# Patient Record
Sex: MALE | Race: OTHER | HISPANIC OR LATINO | URBAN - METROPOLITAN AREA
[De-identification: names, ages, dates, MRNs, and addresses within clinical notes are randomized per-mention and may not be internally consistent; named-entity substitution may affect disease eponyms.]

---

## 2022-05-04 ENCOUNTER — OFFICE VISIT (OUTPATIENT)
Dept: URBAN - METROPOLITAN AREA CLINIC 63 | Facility: CLINIC | Age: 32
End: 2022-05-04

## 2022-07-08 ENCOUNTER — CLAIMS CREATED FROM THE CLAIM WINDOW (OUTPATIENT)
Dept: URBAN - METROPOLITAN AREA SURGERY CENTER 4 | Facility: SURGERY CENTER | Age: 32
End: 2022-07-08
Payer: COMMERCIAL

## 2022-07-08 ENCOUNTER — CLAIMS CREATED FROM THE CLAIM WINDOW (OUTPATIENT)
Dept: URBAN - METROPOLITAN AREA CLINIC 4 | Facility: CLINIC | Age: 32
End: 2022-07-08
Payer: COMMERCIAL

## 2022-07-08 DIAGNOSIS — K44.9 HIATAL HERNIA: ICD-10-CM

## 2022-07-08 DIAGNOSIS — K29.50 UNSPECIFIED CHRONIC GASTRITIS WITHOUT BLEEDING: ICD-10-CM

## 2022-07-08 DIAGNOSIS — K31.89 OTHER DISEASES OF STOMACH AND DUODENUM: ICD-10-CM

## 2022-07-08 DIAGNOSIS — R19.8 OTHER SPECIFIED SYMPTOMS AND SIGNS INVOLVING THE DIGESTIVE SYSTEM AND ABDOMEN: ICD-10-CM

## 2022-07-08 DIAGNOSIS — K22.70 BARRETT ESOPHAGUS: ICD-10-CM

## 2022-07-08 DIAGNOSIS — K22.8 MUCOSAL ABNORMALITY OF ESOPHAGUS: ICD-10-CM

## 2022-07-08 PROCEDURE — 43239 EGD BIOPSY SINGLE/MULTIPLE: CPT | Performed by: INTERNAL MEDICINE

## 2022-07-08 PROCEDURE — G8907 PT DOC NO EVENTS ON DISCHARG: HCPCS | Performed by: INTERNAL MEDICINE

## 2022-07-08 PROCEDURE — G8918 PT W/O PREOP ORDER IV AB PRO: HCPCS | Performed by: INTERNAL MEDICINE

## 2022-07-08 PROCEDURE — 88312 SPECIAL STAINS GROUP 1: CPT | Performed by: PATHOLOGY

## 2022-07-08 PROCEDURE — 88305 TISSUE EXAM BY PATHOLOGIST: CPT | Performed by: PATHOLOGY

## 2022-07-09 ENCOUNTER — TELEPHONE ENCOUNTER (OUTPATIENT)
Dept: URBAN - METROPOLITAN AREA CLINIC 121 | Facility: CLINIC | Age: 32
End: 2022-07-09

## 2022-07-09 RX ORDER — HYOSCYAMINE SULFATE 0.12 MG/1
TAKE AS DIRECTEDAS NEEDED (MAX 4 TIMES A DAY) TABLET, ORALLY DISINTEGRATING ORAL TAKE AS DIRECTED
Refills: 0 | OUTPATIENT
Start: 2016-11-15 | End: 2020-01-22

## 2022-07-09 RX ORDER — SUCRALFATE 1 G/1
TWICE A DAY TABLET ORAL TWICE A DAY
Refills: 0 | OUTPATIENT
Start: 2016-10-19 | End: 2016-11-15

## 2022-07-09 RX ORDER — ESOMEPRAZOLE MAGNESIUM 20 MG
PRN CAPSULE,DELAYED RELEASE (ENTERIC COATED) ORAL TWICE A DAY
Refills: 0 | OUTPATIENT
Start: 2016-10-19 | End: 2016-11-15

## 2022-07-09 RX ORDER — ESOMEPRAZOLE MAGNESIUM 20 MG
PRN CAPSULE,DELAYED RELEASE (ENTERIC COATED) ORAL TWICE A DAY
Refills: 0 | OUTPATIENT
Start: 2020-01-22 | End: 2020-01-22

## 2022-07-09 RX ORDER — ESOMEPRAZOLE MAGNESIUM 20 MG
PRN CAPSULE,DELAYED RELEASE (ENTERIC COATED) ORAL TWICE A DAY
Refills: 0 | OUTPATIENT
Start: 2016-11-15 | End: 2020-01-22

## 2022-07-09 RX ORDER — SUCRALFATE 1 G/1
TABLET ORAL TWICE A DAY
Refills: 0 | OUTPATIENT
Start: 2016-11-15 | End: 2020-01-22

## 2022-07-10 ENCOUNTER — TELEPHONE ENCOUNTER (OUTPATIENT)
Dept: URBAN - METROPOLITAN AREA CLINIC 121 | Facility: CLINIC | Age: 32
End: 2022-07-10

## 2022-07-10 RX ORDER — PANTOPRAZOLE 20 MG/1
TABLET, DELAYED RELEASE ORAL TWICE A DAY
Refills: 0 | Status: ACTIVE | COMMUNITY
Start: 2020-01-22

## 2022-07-10 RX ORDER — SUCRALFATE 1 G/1
TABLET ORAL TWICE A DAY
Refills: 1 | Status: ACTIVE | COMMUNITY
Start: 2016-11-16

## 2022-07-10 RX ORDER — MONTELUKAST 10 MG/1
TABLET, FILM COATED ORAL ONCE A DAY
Refills: 0 | Status: ACTIVE | COMMUNITY
Start: 2022-05-04

## 2022-07-25 ENCOUNTER — LAB OUTSIDE AN ENCOUNTER (OUTPATIENT)
Dept: URBAN - METROPOLITAN AREA CLINIC 63 | Facility: CLINIC | Age: 32
End: 2022-07-25

## 2022-07-25 ENCOUNTER — OFFICE VISIT (OUTPATIENT)
Dept: URBAN - METROPOLITAN AREA CLINIC 63 | Facility: CLINIC | Age: 32
End: 2022-07-25
Payer: COMMERCIAL

## 2022-07-25 VITALS
HEIGHT: 66 IN | SYSTOLIC BLOOD PRESSURE: 116 MMHG | TEMPERATURE: 98.1 F | WEIGHT: 178.2 LBS | HEART RATE: 86 BPM | OXYGEN SATURATION: 98 % | DIASTOLIC BLOOD PRESSURE: 80 MMHG | BODY MASS INDEX: 28.64 KG/M2

## 2022-07-25 DIAGNOSIS — K29.70 GASTRITIS, UNSPECIFIED, WITHOUT BLEEDING: ICD-10-CM

## 2022-07-25 DIAGNOSIS — K90.9 INTESTINAL MALABSORPTION, UNSPECIFIED: ICD-10-CM

## 2022-07-25 DIAGNOSIS — R19.7 DIARRHEA, UNSPECIFIED: ICD-10-CM

## 2022-07-25 PROBLEM — 62315008: Status: ACTIVE | Noted: 2022-07-25

## 2022-07-25 PROCEDURE — 99213 OFFICE O/P EST LOW 20 MIN: CPT | Performed by: INTERNAL MEDICINE

## 2022-07-25 RX ORDER — PANTOPRAZOLE 20 MG/1
TABLET, DELAYED RELEASE ORAL TWICE A DAY
Refills: 0 | Status: ACTIVE | COMMUNITY
Start: 2020-01-22

## 2022-07-25 RX ORDER — SUCRALFATE 1 G/1
TABLET ORAL TWICE A DAY
Refills: 1 | Status: ACTIVE | COMMUNITY
Start: 2016-11-16

## 2022-07-25 RX ORDER — MONTELUKAST 10 MG/1
TABLET, FILM COATED ORAL ONCE A DAY
Refills: 0 | Status: ACTIVE | COMMUNITY
Start: 2022-05-04

## 2022-07-25 NOTE — HPI-TODAY'S VISIT:
Patient comes with chronic GERD and  epigastric pain with h/o h pylori s/p treatment never was follow up, had colonoscopy as per patient was normal with hemorrhoids.  patient with worsen of the symptoms went to the ER Ct scan and X ray was normal as per patient. Will need records will do EGD and will continue with nexium. Will add carafate.  11/16: patient had EGD with evidence of hiatal henria, gastritis negative for h pylori and normal small bowel. Will continue PPI and carafate and will  try to wean him off the treatment, patient with epigastric pain questionable esophageal spasm will do trial with levsin 0.125 prn to evaluate if there is resolution of the pain. And will conitnue diet.  1/20: Patient was recently at the hospital admission with viral infection had nausea and diarrhea, and had some rectal bleeding, Was sent from the hospital also had hematemesis (small amount of blood) Patient now feels well, denies any GI symptoms with a colonoscopy done in 2016 and EGD,  Will conisder EGD at the end of the year and will follow as needed basis.  5/22:  Patient with GERD and with episode of epigastric pain after meals, patient in occasions has diarrhea. Will do EGD and will plan biospies of the duodenum. Patient with lactose intolerance. Will follow closely. 7/22: Patient had an upper endoscopy 8 July of 2022 with impression of normal esophagus, small hiatal hernia, chronic gastritis and normal duodenum.  Pathology report shows no significant abnormality in the duodenum, normal stomach and distal esophagus with normal mucosa negative for eosinophilic esophagitis negative for dysplasia or malignancy.  With this finding there is no evidence of any infection, pathology that could explain diarrhea, likely could be associated with intolerance of meal, will need to continue diet.  There is no evidence of any ulcer or erosion in the stomach, with use PPI as needed, will hold for treatment depending on his clinical course. Patient with improvement of his diarrhea but not with a complete resolution.  Will do ultrasound and will check GB, may consider HIDA scan. Will conisder further w/u (flex sig or colonoscopy) If all the w/u is negative and patient persist with diarrhea will treat as IBS-D .

## 2022-07-30 ENCOUNTER — TELEPHONE ENCOUNTER (OUTPATIENT)
Age: 32
End: 2022-07-30

## 2022-07-30 RX ORDER — CLARITHROMYCIN 500 MG/1
1 (ONE) TABLET ORAL
Qty: 0 | Refills: 2 | OUTPATIENT
Start: 2015-11-25 | End: 2015-12-05

## 2022-07-30 RX ORDER — AMOXICILLIN 500 MG/1
2 (TWO) CAPSULE ORAL
Qty: 0 | Refills: 2 | OUTPATIENT
Start: 2015-11-25 | End: 2015-12-05

## 2022-07-30 RX ORDER — SACCHAROMYCES BOULARDII 250 MG
1 (ONE) CAPSULE ORAL
Qty: 0 | Refills: 2 | OUTPATIENT
Start: 2015-11-24 | End: 2015-12-24

## 2022-07-30 RX ORDER — ALUMINUM HYDROXIDE AND MAGNESIUM CARBONATE 95; 358 MG/15ML; MG/15ML
15ML LIQUID ORAL
Qty: 0 | Refills: 2 | OUTPATIENT
Start: 2015-11-24 | End: 2015-12-24

## 2022-07-31 ENCOUNTER — TELEPHONE ENCOUNTER (OUTPATIENT)
Age: 32
End: 2022-07-31

## 2022-07-31 RX ORDER — ALUMINUM HYDROXIDE AND MAGNESIUM CARBONATE 95; 358 MG/15ML; MG/15ML
15ML LIQUID ORAL
Qty: 0 | Refills: 2 | Status: ACTIVE | COMMUNITY
Start: 2015-11-24

## 2022-07-31 RX ORDER — SACCHAROMYCES BOULARDII 250 MG
1 (ONE) CAPSULE ORAL
Qty: 0 | Refills: 2 | Status: ACTIVE | COMMUNITY
Start: 2015-11-24

## 2022-07-31 RX ORDER — FAMOTIDINE 10 MG
1 (ONE) TABLET ORAL AT BEDTIME
Qty: 0 | Refills: 16 | Status: ACTIVE | COMMUNITY
Start: 2015-11-24

## 2022-07-31 RX ORDER — AMOXICILLIN 500 MG/1
2 (TWO) CAPSULE ORAL
Qty: 0 | Refills: 2 | Status: ACTIVE | COMMUNITY
Start: 2015-11-25

## 2022-07-31 RX ORDER — CLARITHROMYCIN 500 MG/1
1 (ONE) TABLET ORAL
Qty: 0 | Refills: 2 | Status: ACTIVE | COMMUNITY
Start: 2015-11-25

## 2022-09-16 PROBLEM — 84568007 ATROPHIC GASTRITIS: Status: ACTIVE | Noted: 2022-09-16

## 2022-09-16 PROBLEM — 302914006 BARRETT ESOPHAGUS: Status: ACTIVE | Noted: 2022-09-16

## 2022-10-24 ENCOUNTER — OFFICE VISIT (OUTPATIENT)
Dept: URBAN - METROPOLITAN AREA CLINIC 63 | Facility: CLINIC | Age: 32
End: 2022-10-24

## 2023-07-18 ENCOUNTER — P2P PATIENT RECORD (OUTPATIENT)
Age: 33
End: 2023-07-18

## 2023-07-31 ENCOUNTER — TELEPHONE ENCOUNTER (OUTPATIENT)
Dept: URBAN - METROPOLITAN AREA CLINIC 23 | Facility: CLINIC | Age: 33
End: 2023-07-31

## 2023-08-01 PROBLEM — 162031009: Status: ACTIVE | Noted: 2023-08-01

## 2023-08-01 PROBLEM — 79922009: Status: ACTIVE | Noted: 2023-08-01

## 2023-08-02 ENCOUNTER — DASHBOARD ENCOUNTERS (OUTPATIENT)
Age: 33
End: 2023-08-02

## 2023-08-02 ENCOUNTER — WEB ENCOUNTER (OUTPATIENT)
Dept: URBAN - METROPOLITAN AREA CLINIC 7 | Facility: CLINIC | Age: 33
End: 2023-08-02

## 2023-08-02 ENCOUNTER — OFFICE VISIT (OUTPATIENT)
Dept: URBAN - METROPOLITAN AREA CLINIC 7 | Facility: CLINIC | Age: 33
End: 2023-08-02
Payer: COMMERCIAL

## 2023-08-02 VITALS
DIASTOLIC BLOOD PRESSURE: 88 MMHG | HEART RATE: 80 BPM | SYSTOLIC BLOOD PRESSURE: 124 MMHG | HEIGHT: 66 IN | WEIGHT: 180 LBS | BODY MASS INDEX: 28.93 KG/M2 | TEMPERATURE: 97.7 F

## 2023-08-02 DIAGNOSIS — R10.13 EPIGASTRIC PAIN: ICD-10-CM

## 2023-08-02 DIAGNOSIS — R19.7 DIARRHEA, UNSPECIFIED TYPE: ICD-10-CM

## 2023-08-02 PROCEDURE — 99214 OFFICE O/P EST MOD 30 MIN: CPT | Performed by: INTERNAL MEDICINE

## 2023-08-02 PROCEDURE — 99204 OFFICE O/P NEW MOD 45 MIN: CPT | Performed by: INTERNAL MEDICINE

## 2023-08-02 RX ORDER — AMOXICILLIN 500 MG/1
2 (TWO) CAPSULE ORAL
Qty: 0 | Refills: 2 | Status: DISCONTINUED | COMMUNITY
Start: 2015-11-25

## 2023-08-02 RX ORDER — PANTOPRAZOLE 20 MG/1
TABLET, DELAYED RELEASE ORAL TWICE A DAY
Refills: 0 | Status: DISCONTINUED | COMMUNITY
Start: 2020-01-22

## 2023-08-02 RX ORDER — MONTELUKAST 10 MG/1
TABLET, FILM COATED ORAL ONCE A DAY
Refills: 0 | Status: ACTIVE | COMMUNITY
Start: 2022-05-04

## 2023-08-02 RX ORDER — OMEPRAZOLE 20 MG/1
1 CAPSULE 30 MINUTES BEFORE MORNING MEAL CAPSULE, DELAYED RELEASE ORAL ONCE A DAY
Status: ACTIVE | COMMUNITY

## 2023-08-02 RX ORDER — ALUMINUM HYDROXIDE AND MAGNESIUM CARBONATE 95; 358 MG/15ML; MG/15ML
15ML LIQUID ORAL
Qty: 0 | Refills: 2 | Status: DISCONTINUED | COMMUNITY
Start: 2015-11-24

## 2023-08-02 RX ORDER — AMOXICILLIN AND CLAVULANATE POTASSIUM 875; 125 MG/1; MG/1
1 TABLET TABLET, FILM COATED ORAL
Status: ACTIVE | COMMUNITY

## 2023-08-02 RX ORDER — CLARITHROMYCIN 500 MG/1
1 (ONE) TABLET ORAL
Qty: 0 | Refills: 2 | Status: DISCONTINUED | COMMUNITY
Start: 2015-11-25

## 2023-08-02 RX ORDER — METRONIDAZOLE 500 MG/1
1 TABLET TABLET ORAL THREE TIMES A DAY
Status: ACTIVE | COMMUNITY

## 2023-08-02 RX ORDER — SUCRALFATE 1 G/1
TABLET ORAL TWICE A DAY
Refills: 1 | Status: DISCONTINUED | COMMUNITY
Start: 2016-11-16

## 2023-08-02 RX ORDER — SACCHAROMYCES BOULARDII 250 MG
1 (ONE) CAPSULE ORAL
Qty: 0 | Refills: 2 | Status: DISCONTINUED | COMMUNITY
Start: 2015-11-24

## 2023-08-02 RX ORDER — FAMOTIDINE 10 MG
1 (ONE) TABLET ORAL AT BEDTIME
Qty: 0 | Refills: 16 | Status: DISCONTINUED | COMMUNITY
Start: 2015-11-24

## 2023-08-02 NOTE — HPI-TODAY'S VISIT:
Patient is new to the practice and was previously evaluated by GI locally back in May 2022.  He was evaluated for symptoms of gastroesophageal reflux and intermittent diarrheal symptoms.  Plan at that time was to pursue upper endoscopy and to biopsy the duodenum.  He also has a history of lactose intolerance.  EGD July 2022 demonstrated normal esophagus small hiatal hernia chronic gastritis and normal duodenum with pathology demonstrating biopsies negative for celiac disease negative for H. pylori and negative for EOE and Vargas's.  Colonoscopy back in November 2015 for hematochezia demonstrated grade 1 internal hemorrhoids but otherwise normal colonic exam with biopsies taken from the colon which were negative for inflammatory cells.  Creatinine was 0.9, AST 63, normal ALT, hemoglobin 14.5 back in 2022.  CT pelvis September 2020 demonstrated a small fat-containing umbilical hernia, no lymphadenopathy, no bowel inflammation.  More recently, the patient was in the ER and did have a CT renal stone protocol July 2023 which demonstrated no acute abnormality, hepatic steatosis, and mild colonic diverticulosis.  Lipase 38, normal lactic acid, creatinine 0.78, normal LFTs with the exception of a bilirubin of 1.4, and hemoglobin 14.2.  Who presented to the ER with epigastric abdominal pain with nausea vomiting and diarrhea up to 10 times daily for the past 3 to 4 weeks.  He had just finished visiting [place].  He was treated with a GI cocktail and was then placed on omeprazole and discharged.  He tells me he has always had issues with his stomach, had diarrhea, fever, when traveling out of the country. He had some severe colicky symptoms, watery diarrhea, uncontrolled. Had HP breath test done yesterday but not clear if this was able to be done. Has had sore throat, oral ulcers developed as well in the back of the throat. Started on metronidazole, augmentin at this point. BM's 2-3 times per day, no watery, more formed. Bloating. Has been on antibiotics. He is on PPI right now, but not usually.

## 2024-07-29 ENCOUNTER — APPOINTMENT (RX ONLY)
Dept: URBAN - METROPOLITAN AREA CLINIC 148 | Facility: CLINIC | Age: 34
Setting detail: DERMATOLOGY
End: 2024-07-29

## 2024-07-29 DIAGNOSIS — L738 OTHER SPECIFIED DISEASES OF HAIR AND HAIR FOLLICLES: ICD-10-CM

## 2024-07-29 DIAGNOSIS — L663 OTHER SPECIFIED DISEASES OF HAIR AND HAIR FOLLICLES: ICD-10-CM

## 2024-07-29 DIAGNOSIS — L73.9 FOLLICULAR DISORDER, UNSPECIFIED: ICD-10-CM

## 2024-07-29 PROBLEM — L02.821 FURUNCLE OF HEAD [ANY PART, EXCEPT FACE]: Status: ACTIVE | Noted: 2024-07-29

## 2024-07-29 PROCEDURE — ? COUNSELING

## 2024-07-29 PROCEDURE — 99203 OFFICE O/P NEW LOW 30 MIN: CPT

## 2024-07-29 PROCEDURE — ? TREATMENT REGIMEN

## 2024-07-29 PROCEDURE — ? PRESCRIPTION

## 2024-07-29 RX ORDER — CLINDAMYCIN PHOSPHATE 10 MG/ML
SOLUTION TOPICAL
Qty: 60 | Refills: 3 | Status: ERX | COMMUNITY
Start: 2024-07-29

## 2024-07-29 RX ADMIN — CLINDAMYCIN PHOSPHATE: 10 SOLUTION TOPICAL at 00:00

## 2024-07-29 ASSESSMENT — LOCATION SIMPLE DESCRIPTION DERM: LOCATION SIMPLE: POSTERIOR SCALP

## 2024-07-29 ASSESSMENT — LOCATION DETAILED DESCRIPTION DERM: LOCATION DETAILED: RIGHT SUPERIOR OCCIPITAL SCALP

## 2024-07-29 ASSESSMENT — LOCATION ZONE DERM: LOCATION ZONE: SCALP
